# Patient Record
Sex: FEMALE | Race: WHITE | Employment: FULL TIME | URBAN - METROPOLITAN AREA
[De-identification: names, ages, dates, MRNs, and addresses within clinical notes are randomized per-mention and may not be internally consistent; named-entity substitution may affect disease eponyms.]

---

## 2018-03-23 ENCOUNTER — HOSPITAL ENCOUNTER (OUTPATIENT)
Dept: GENERAL RADIOLOGY | Age: 43
Discharge: HOME | End: 2018-03-23
Attending: INTERNAL MEDICINE
Payer: COMMERCIAL

## 2018-03-23 DIAGNOSIS — R07.9 RIGHT-SIDED CHEST PAIN: ICD-10-CM

## 2018-03-23 PROCEDURE — 71101 X-RAY EXAM UNILAT RIBS/CHEST: CPT

## 2018-09-07 ENCOUNTER — HOSPITAL ENCOUNTER (OUTPATIENT)
Dept: CT IMAGING | Age: 43
Discharge: HOME | End: 2018-09-07
Payer: COMMERCIAL

## 2018-09-07 DIAGNOSIS — M94.0 COSTOCHONDRITIS: ICD-10-CM

## 2018-09-07 PROCEDURE — 74150 CT ABDOMEN W/O CONTRAST: CPT

## 2019-01-15 ENCOUNTER — OFFICE VISIT (OUTPATIENT)
Dept: NEUROLOGY | Age: 44
End: 2019-01-15

## 2019-01-15 VITALS
SYSTOLIC BLOOD PRESSURE: 138 MMHG | BODY MASS INDEX: 25.88 KG/M2 | HEIGHT: 62 IN | WEIGHT: 140.6 LBS | DIASTOLIC BLOOD PRESSURE: 76 MMHG | HEART RATE: 91 BPM | RESPIRATION RATE: 17 BRPM | OXYGEN SATURATION: 99 %

## 2019-01-15 DIAGNOSIS — I67.89 CEREBRAL MICROVASCULAR DISEASE: ICD-10-CM

## 2019-01-15 DIAGNOSIS — J34.9 DISEASE OF NASAL CAVITY AND SINUSES: Primary | ICD-10-CM

## 2019-01-15 DIAGNOSIS — R56.9 CONVULSIONS, UNSPECIFIED CONVULSION TYPE (HCC): ICD-10-CM

## 2019-01-15 DIAGNOSIS — R41.82 ALTERED MENTAL STATUS, UNSPECIFIED ALTERED MENTAL STATUS TYPE: ICD-10-CM

## 2019-01-15 DIAGNOSIS — Z86.011 HISTORY OF CEREBRAL MENINGIOMA: ICD-10-CM

## 2019-01-15 DIAGNOSIS — I65.23 BILATERAL CAROTID ARTERY STENOSIS: Primary | ICD-10-CM

## 2019-01-15 DIAGNOSIS — I65.23 BILATERAL CAROTID ARTERY STENOSIS: ICD-10-CM

## 2019-01-15 NOTE — PROGRESS NOTES
Consult REFERRED BY: 
Omar Blanc MD 
 
CHIEF COMPLAINT: Recurrent spells of altered mental status, slight confusion, slight cognitive slowing Subjective:  
 
Carissa Corey is a 37 y.o. handed  female seen as a new patient to me at the request of Dr. Damaris Rodriguez and Dr. Chrissy Andrews for evaluation of new problem of 2 recurring episodes one occurring in July and one occurring in October both lasting about a week in duration, difficulty with focusing mentally, having some left-sided facial numbness, seemingly foggy, having a little difficulty focusing in on driving, and just feeling mentally off without clear precipitating cause. Patient does have a past history of meningioma resection in 2012 of the left posterior cerebral meningioma and just had a recent MRI scan done in November 2018 that showed stable appearance of the left occipital region and postsurgical changes and mild encephalomalacia but also showing marked obstructive sinus disease on the left side. Patient has seen ENT sees no bony erosion on CT of the sinuses, and suggested the possibility of surgery versus conservative management, and the patient has no fever, no signs of active sinusitis, no headache, and no nasal discharge and she is not quite sure what to do and I do not think that is probably related to these episodes of mental changes. She took [address] for 2 months after the surgery but never really had a history of seizures. Her neurosurgeon has released her and says she does not need any other follow-up since she is 6 years status post surgery. She has no visual symptoms, no focal weakness or sensory loss with any of these episodes, and nobody at her work complained of her mental abilities and her  did not notice any real change even though she felt slightly off.   During this time she denies any increased stress tension, denies any evident cause, no fever, no meningismus, no headaches, no unusual toxins trauma, the patient has no other previous history of focal neurologic deficits except for some slight visual changes that prompted her evaluation and meningioma resection, but they have all been nonexistent since her surgery. She has no other major medical problems. She denies any other previous stress or psychiatric problems. She does take thyroid supplement and had her gallbladder removed and has some chronic GI complaints ever since her gallbladder surgery. She has never had a stroke or TIA. Her bowel and bladder functions normal, and she is a good diet, no unusual weight gain or weight loss and no other systemic symptoms. Past Medical History:  
Diagnosis Date  Asthma EXERCISED INDUCED  Disease of nasal cavity and sinuses  History of cerebral meningioma  Hypothyroid   Hypothyroidism  Ill-defined condition LICHEN PLANUS SKIN DISORDER  
 Neurological disorder Past Surgical History:  
Procedure Laterality Date  HX BREAST BIOPSY Left NEG  
 HX CHOLECYSTECTOMY  16  
 [employer] [facility]-Dr. Jorge Paul  HX OTHER SURGICAL    
 brain tumor removal  
 NEUROLOGICAL PROCEDURE UNLISTED   BENIGN BRAIN TUMOR REMOVED Family History Problem Relation Age of Onset  Hypertension Mother  Elevated Lipids Mother  Cancer Father ESAPHOGUS  
 COPD Father  Hypertension Sister  Elevated Lipids Sister  Thyroid Disease Sister  Arthritis-osteo Sister  Heart Disease Brother  No Known Problems Sister  No Known Problems Sister Social History Tobacco Use  Smoking status: Former Smoker Last attempt to quit: 2009 Years since quittin.6  Smokeless tobacco: Never Used Substance Use Topics  Alcohol use: Yes Alcohol/week: 4.8 oz Types: 8 Cans of beer per week Current Outpatient Medications:   fluticasone (CUTIVATE) 0.05 % topical cream, , Disp: , Rfl: 1 
  PROAIR HFA 90 mcg/actuation inhaler, , Disp: , Rfl: 4 
  levothyroxine (SYNTHROID) 50 mcg tablet, , Disp: , Rfl: 1   ALYACEN 1/35, 28, 1-35 mg-mcg per tablet, , Disp: , Rfl: 5 No Known Allergies MRI Results (most recent): No results found for this or any previous visit. No results found for this or any previous visit. Review of Systems: A comprehensive review of systems was negative except for: Constitutional: positive for fatigue and malaise Neurological: positive for headaches, memory problems, coordination problems and Confusional concentration difficulty Vitals:  
 01/15/19 0801 BP: 138/76 Pulse: 91  
Resp: 17 SpO2: 99% Weight: 140 lb 9.6 oz (63.8 kg) Height: 5' 2\" (1.575 m) Objective: I 
 
 
NEUROLOGICAL EXAM: 
 
Appearance: The patient is well developed, well nourished, provides a coherent history and is in no acute distress. Mental Status: Oriented to time, place and person, and the president, patient could remember 3 of 3 words at 30 seconds with distraction, could do serial sevens okay, spell world backwards, and draw a clock that shows a time 10:50, and cognitive function is normal and speech is fluent and no aphasia or dysarthria. Mood and affect appropriate. Cranial Nerves:   Intact visual fields. Visual acuity was 20/20 on the right and 20/25 on the left, Fundi are benign, disc are flat, no lesions seen on funduscopy. LOLA, EOM's full, no nystagmus, no ptosis. Facial sensation is normal. Corneal reflexes are not tested. Facial movement is symmetric. Hearing is normal bilaterally. Palate is midline with normal sternocleidomastoid and trapezius muscles are normal. Tongue is midline. Neck without meningismus or bruits Temporal arteries are not tender or enlarged TMJ areas are not tender on palpation Motor:  5/5 strength in upper and lower proximal and distal muscles. Normal bulk and tone. No fasciculations. Rapid alternating movement is symmetric and intact bilaterally Reflexes:   Deep tendon reflexes 2+/4 and symmetrical. 
No babinski or clonus present Sensory:   Normal to touch, pinprick and vibration and temperature. DSS is intact Gait:  Normal gait for patient's age. Tremor:   No tremor noted. Cerebellar:  No abnormal cerebellar signs present on Romberg and tandem testing and finger-nose-finger exam.  
Neurovascular:  Normal heart sounds and regular rhythm, peripheral pulses intact, and no carotid bruits. Assessment: ICD-10-CM ICD-9-CM 1. Disease of nasal cavity and sinuses J34.9 478.19 NEURO EEG 24 HR   
   DUPLEX CAROTID BILATERAL AMB NEURO 2. History of cerebral meningioma Z86.69 V12.41 NEURO EEG 24 HR   
   DUPLEX CAROTID BILATERAL AMB NEURO 3. Altered mental status, unspecified altered mental status type R41.82 780.97 NEURO EEG 24 HR   
   DUPLEX CAROTID BILATERAL AMB NEURO 4. Convulsions, unspecified convulsion type ([facility] [address] R56.9 780.39 NEURO EEG 24 HR   
   DUPLEX CAROTID BILATERAL AMB NEURO 5. Bilateral carotid artery stenosis I65.23 433.10 NEURO EEG 24 HR   
  433.30 DUPLEX CAROTID BILATERAL AMB NEURO 6. Cerebral microvascular disease I67.9 437.9 NEURO EEG 24 HR   
   DUPLEX CAROTID BILATERAL AMB NEURO Active Problems: * No active hospital problems. * 
 
 
Plan:  
 
Unusual history of cognitive issues occurring for about a week in July and October of unclear etiology, with unremarkable MRI of the brain and a normal exam now She does not seem to show any cognitive difficulty or deficits, and does not seem to have any focal deficits on exam. 
She had an MRI scan of the brain 1 month ago showed stable postop changes but no new lesions.  
Patient will check carotid Doppler study to make sure there is no vascular insufficiency, and check an ambulatory 24-hour EEG just to make sure there is no abnormal discharges or partial complex seizures occurring Patient will ll check my chart for results of her test, or call us for results done, and call us also if she has any recurrent symptoms or other focal neurological deficits We will follow the patient again in 6 months time or earlier as needed and she will call immediately has any further spells. Encouraged to remain mentally and physically active, her vitamins and her thyroid pills and vitamin D on a daily basis, call us if she has any other problems. Difficult case, one hour spent with patient, reviewing her x-rays, review her labs, her diagnosis prognosis and further treatment evaluation and therapy to be considered. Signed By: Janak Gallegos MD   
 January 15, 2019 CC: Leodan Mix MD 
FAX: [phone] This note will not be viewable in [address]

## 2019-01-15 NOTE — PROGRESS NOTES
Chief Complaint Patient presents with  New Patient  
  trouble concentrating, left side of sinus area is blocked 1. Have you been to the ER, urgent care clinic since your last visit? Hospitalized since your last visit? 2. Have you seen or consulted any other health care providers outside of the [address] [place] [address] since your last visit? Dr. Mode Fontaine, neurosurgical at [facility]

## 2019-01-16 NOTE — PROCEDURES
This study consisted of pulsed wave Doppler examination, Color-flow imaging, and Duplex imaging of both the right and left carotid systems, and both vertebral arteries.     Imaging of both right and left carotid systems showed no mixed plaquing at the bifurcations and proximal and distal internal and external carotid arteries bilaterally, with stenosis in the range of 0% only and with no flow abnormalities identified.     Both vertebral arteries showed normal antegrade flow

## 2019-02-06 ENCOUNTER — HOSPITAL ENCOUNTER (OUTPATIENT)
Dept: NEUROLOGY | Age: 44
Discharge: HOME | End: 2019-02-06
Attending: PSYCHIATRY & NEUROLOGY
Payer: COMMERCIAL

## 2019-02-06 DIAGNOSIS — J34.9 DISEASE OF NASAL CAVITY AND SINUSES: ICD-10-CM

## 2019-02-06 DIAGNOSIS — I67.89 CEREBRAL MICROVASCULAR DISEASE: ICD-10-CM

## 2019-02-06 DIAGNOSIS — I65.23 BILATERAL CAROTID ARTERY STENOSIS: ICD-10-CM

## 2019-02-06 DIAGNOSIS — R41.82 ALTERED MENTAL STATUS, UNSPECIFIED ALTERED MENTAL STATUS TYPE: ICD-10-CM

## 2019-02-06 DIAGNOSIS — Z86.011 HISTORY OF CEREBRAL MENINGIOMA: ICD-10-CM

## 2019-02-06 DIAGNOSIS — R56.9 CONVULSIONS, UNSPECIFIED CONVULSION TYPE (HCC): ICD-10-CM

## 2019-02-06 PROCEDURE — 95953 NEURO EEG 24 HR: CPT

## 2019-02-08 PROBLEM — R41.82 ALTERED MENTAL STATUS, UNSPECIFIED: Status: ACTIVE | Noted: 2019-02-08

## 2019-02-08 PROBLEM — R56.9 CONVULSION (HCC): Status: ACTIVE | Noted: 2019-02-08

## 2019-02-09 NOTE — PROCEDURES
[facility]  EEG    Name:  Olena Vazquez  MR#:  [id number]  :  1975  ACCOUNT #:  [de-identified]  DATE OF SERVICE:    24-HOUR AMBULATORY EEG RECORDING    CLINICAL INDICATIONS:  The patient is a 55-year-old female with a history of sudden  confusion episodes. The patient with numbness on the left side. EEG to rule out  seizures, rule out cortical abnormality, rule out discharge. The patient with past  history of meningoma of the brain. EEG CLASSIFICATION:  Dysrhythmia grade I, left hemisphere, maximum occipital.    DESCRIPTION OF THE RECORD:  This is a 16-channel 24-hour prolonged EEG recording on  patient to rule out seizures with EKG monitoring throughout the study also. This  study began on 2019 at approximately 11:59 a.m. and ended on 2019 at  approximately 12:31 p.m. along with 25 hours of EEG recording. This study was done  to rule out seizure in a patient having confusion episode; headaches; and left-sided  numbness twice, once in the summer and once in the fall of this year. Rule out  seizures. The patient with history of meningoma resected in the left occipital  region. BACKGROUND HISTORY:   Posteriorly located occipital alpha rhythm of 9 to 10 Hz that  did attenuate some with eye opening. During the recording, there was considerable  movement, muscle artifact seen at times, but overall technically the study was of  very good quality. The last hour of the study was approximately 12 noon to 1 p.m.  showed a persistent artifact through most of that last hour recording. Rest of the  study was fairly well interpretable. There were no clear areas of spike discharges  seen nor abnormal discharges seen. At times, there was borderline slowing in the  left posterior head regions somewhat due to drowsiness. No recorded spells of any  type were identified. The patient's diary has not returned so far at this time.   The  patient did have a prolonged phase of sleep with K-complexes and sleep spindles seen  in the central head regions. Neither hyperventilation nor photic stimulation was  performed. Again, there was no clear focal abnormality identified. No spike or  spike-and-wave discharges seen. No recorded spells of any type on the EEG. There  was a slight borderline increase in some 2 to 6 Hz daily activity at times seen in  occipital head region on the left side during recording. INTERPRETATION:  This is a minimally abnormal electroencephalogram due to some  borderline slowing seen in the left occipital region. There were no clear areas of  spike or spike-and-wave discharge, no recorded spells of any type. No dysrhythmic  activity.         Keely Martin MD      [id number]  D:  02/08/2019 13:32  T:  02/09/2019 5:00  JOB #:  [id number]  CC:  Joe Mitchell MD

## 2019-02-14 ENCOUNTER — TELEPHONE (OUTPATIENT)
Dept: NEUROLOGY | Age: 44
End: 2019-02-14

## 2019-07-17 ENCOUNTER — OFFICE VISIT (OUTPATIENT)
Dept: NEUROLOGY | Age: 44
End: 2019-07-17

## 2019-07-17 VITALS
WEIGHT: 143 LBS | RESPIRATION RATE: 12 BRPM | DIASTOLIC BLOOD PRESSURE: 72 MMHG | HEIGHT: 62 IN | HEART RATE: 87 BPM | SYSTOLIC BLOOD PRESSURE: 134 MMHG | OXYGEN SATURATION: 99 % | BODY MASS INDEX: 26.31 KG/M2

## 2019-07-17 DIAGNOSIS — Z86.011 HISTORY OF CEREBRAL MENINGIOMA: ICD-10-CM

## 2019-07-17 DIAGNOSIS — R41.82 ALTERED MENTAL STATUS, UNSPECIFIED ALTERED MENTAL STATUS TYPE: ICD-10-CM

## 2019-07-17 DIAGNOSIS — R56.9 CONVULSIONS, UNSPECIFIED CONVULSION TYPE (HCC): Primary | ICD-10-CM

## 2019-07-17 RX ORDER — NORETHINDRONE ACETATE AND ETHINYL ESTRADIOL 1MG-20(24)
KIT ORAL
Refills: 3 | COMMUNITY
Start: 2019-05-18

## 2019-07-17 NOTE — PATIENT INSTRUCTIONS
A Healthy Lifestyle: Care Instructions  Your Care Instructions    A healthy lifestyle can help you feel good, stay at a healthy weight, and have plenty of energy for both work and play. A healthy lifestyle is something you can share with your whole family. A healthy lifestyle also can lower your risk for serious health problems, such as high blood pressure, heart disease, and diabetes. You can follow a few steps listed below to improve your health and the health of your family. Follow-up care is a key part of your treatment and safety. Be sure to make and go to all appointments, and call your doctor if you are having problems. It's also a good idea to know your test results and keep a list of the medicines you take. How can you care for yourself at home? · Do not eat too much sugar, fat, or fast foods. You can still have dessert and treats now and then. The goal is moderation. · Start small to improve your eating habits. Pay attention to portion sizes, drink less juice and soda pop, and eat more fruits and vegetables. ? Eat a healthy amount of food. A 3-ounce serving of meat, for example, is about the size of a deck of cards. Fill the rest of your plate with vegetables and whole grains. ? Limit the amount of soda and sports drinks you have every day. Drink more water when you are thirsty. ? Eat at least 5 servings of fruits and vegetables every day. It may seem like a lot, but it is not hard to reach this goal. A serving or helping is 1 piece of fruit, 1 cup of vegetables, or 2 cups of leafy, raw vegetables. Have an apple or some carrot sticks as an afternoon snack instead of a candy bar. Try to have fruits and/or vegetables at every meal.  · Make exercise part of your daily routine. You may want to start with simple activities, such as walking, bicycling, or slow swimming. Try to be active 30 to 60 minutes every day. You do not need to do all 30 to 60 minutes all at once.  For example, you can exercise 3 times a day for 10 or 20 minutes. Moderate exercise is safe for most people, but it is always a good idea to talk to your doctor before starting an exercise program.  · Keep moving. Krystin Garciaas the lawn, work in the garden, or [employer]. Take the stairs instead of the elevator at work. · If you smoke, quit. People who smoke have an increased risk for heart attack, stroke, cancer, and other lung illnesses. Quitting is hard, but there are ways to boost your chance of quitting tobacco for good. ? Use nicotine gum, patches, or lozenges. ? Ask your doctor about stop-smoking programs and medicines. ? Keep trying. In addition to reducing your risk of diseases in the future, you will notice some benefits soon after you stop using tobacco. If you have shortness of breath or asthma symptoms, they will likely get better within a few weeks after you quit. · Limit how much alcohol you drink. Moderate amounts of alcohol (up to 2 drinks a day for men, 1 drink a day for women) are okay. But drinking too much can lead to liver problems, high blood pressure, and other health problems. Family health  If you have a family, there are many things you can do together to improve your health. · Eat meals together as a family as often as possible. · Eat healthy foods. This includes fruits, vegetables, lean meats and dairy, and whole grains. · Include your family in your fitness plan. Most people think of activities such as jogging or tennis as the way to fitness, but there are many ways you and your family can be more active. Anything that makes you breathe hard and gets your heart pumping is exercise. Here are some tips:  ? Walk to do errands or to take your child to school or the bus.  ? Go for a family bike ride after dinner instead of watching TV. Where can you learn more? Go to http://delia-lele.info/. Enter W518 in the search box to learn more about \"A Healthy Lifestyle: Care Instructions. \"  Current as of: September 11, 2018  Content Version: 11.9  © 2053-6740 [employer]. Care instructions adapted under license by [employer] [place] (which disclaims liability or warranty for this information). If you have questions about a medical condition or this instruction, always ask your healthcare professional. [address] any warranty or liability for your use of this information. Office Policies    o Phone calls/patient messages:  Please allow up to 24 hours for someone in the office to contact you about your call or message. Be mindful your provider may be out of the office or your message may require further review. We encourage you to use [employer] for your messages as this is a faster, more efficient way to communicate with our office    o Medication Refills:  Prescription medications require up to 48 business hours to process. We encourage you to use [employer] for your refills. For controlled medications: Please allow up to 72 business hours to process. Certain medications may require you to  a written prescription at our office. NO narcotic/controlled medications will be prescribed after 4pm Monday through Friday or on weekends    o Form/Paperwork Completion:  We ask that you allow 7-14 business days.  You may also download your forms to [employer] to have your doctor print off.        [id number]

## 2019-07-17 NOTE — PROGRESS NOTES
Consult  REFERRED BY:  Nina Martinez MD    CHIEF COMPLAINT: Recurrent spells of altered mental status, slight confusion, slight cognitive slowing      Subjective:     Edna Sheehan is a 40 y.o. handed  female seen at the request of Dr. Ranjan Mckeon and Dr. Espinosa Fearing for evaluation of problem of recurring episodes one occurring in July 2018 and one occurring in October 2018 both lasting about a week in duration, difficulty with focusing mentally, having some left-sided facial numbness, seemingly foggy, having a little difficulty focusing in on driving, and just feeling mentally off without clear precipitating cause. Patient has had intermittent brief ones since last seen in January for this problem, but no loss of conscious, he is able to continue all her activities during this time, just feels a little spaced out. She had a normal 24-hour ambulatory EEG other than just borderline slowing in her surgical site in the left occipital region, stable MRI scan of the brain showing only postop changes, and carotid Doppler studies that show 0% stenosis in all vessels. Patient does not think there are drowsy spells, she does snore at night but has no sleep apnea and no excessive daytime sleepiness. She has no other neurological symptoms during this time. They do not occur with stress and tension. We will just follow these for the next 6 months, see how she does after she gets sinus surgery for severe sinus disease on the left side, and may be somehow that is related to her symptoms. Patient does have a past history of meningioma resection in 2012 of the left posterior cerebral meningioma and just had a recent MRI scan done in November 2018 that showed stable appearance of the left occipital region and postsurgical changes and mild encephalomalacia but also showing marked obstructive sinus disease on the left side.   Patient has seen ENT sees no bony erosion on CT of the sinuses, and suggested the possibility of surgery versus conservative management, and the patient has no fever, no signs of active sinusitis, no headache, and no nasal discharge and she is not quite sure what to do and I do not think that is probably related to these episodes of mental changes. She took [address] for 2 months after the surgery but never really had a history of seizures. Her neurosurgeon has released her and says she does not need any other follow-up since she is 6 years status post surgery. She has no visual symptoms, no focal weakness or sensory loss with any of these episodes, and nobody at her work complained of her mental abilities and her  did not notice any real change even though she felt slightly off. During this time she denies any increased stress tension, denies any evident cause, no fever, no meningismus, no headaches, no unusual toxins trauma, the patient has no other previous history of focal neurologic deficits except for some slight visual changes that prompted her evaluation and meningioma resection, but they have all been nonexistent since her surgery. She has no other major medical problems. She denies any other previous stress or psychiatric problems. She does take thyroid supplement and had her gallbladder removed and has some chronic GI complaints ever since her gallbladder surgery. She has never had a stroke or TIA. Her bowel and bladder functions normal, and she is a good diet, no unusual weight gain or weight loss and no other systemic symptoms.     Past Medical History:   Diagnosis Date    Asthma     EXERCISED INDUCED    Disease of nasal cavity and sinuses     History of cerebral meningioma     Hypothyroid 2010    Hypothyroidism     Ill-defined condition     LICHEN PLANUS SKIN DISORDER    Neurological disorder       Past Surgical History:   Procedure Laterality Date    HX BREAST BIOPSY Left     NEG    HX CHOLECYSTECTOMY  4-19-16    [facility]Dr. Fletcher Peeling    HX OTHER SURGICAL 2012    brain tumor removal    NEUROLOGICAL PROCEDURE UNLISTED  2012    BENIGN BRAIN TUMOR REMOVED     Family History   Problem Relation Age of Onset    Hypertension Mother    Preet Long Elevated Lipids Mother     Cancer Father         ESAPHOGUS    COPD Father     Hypertension Sister     Elevated Lipids Sister     Thyroid Disease Sister     Arthritis-osteo Sister     Heart Disease Brother     No Known Problems Sister     No Known Problems Sister       Social History     Tobacco Use    Smoking status: Former Smoker     Last attempt to quit: 5/9/2009     Years since quitting: 10.1    Smokeless tobacco: Never Used   Substance Use Topics    Alcohol use: Yes     Alcohol/week: 8.0 standard drinks     Types: 8 Cans of beer per week         Current Outpatient Medications:     BLISOVI 24 FE 1 mg-20 mcg (24)/75 mg (4) tab, TAKE 1 TABLET BY MOUTH EVERY DAY, Disp: , Rfl: 3    PROAIR HFA 90 mcg/actuation inhaler, , Disp: , Rfl: 4    levothyroxine (SYNTHROID) 50 mcg tablet, , Disp: , Rfl: 1        No Known Allergies   MRI Results (most recent):  No results found for this or any previous visit. No results found for this or any previous visit. Review of Systems:  A comprehensive review of systems was negative except for: Constitutional: positive for fatigue and malaise  Neurological: positive for headaches, memory problems, coordination problems and Confusional concentration difficulty   Vitals:    07/17/19 0807   BP: 134/72   Pulse: 87   Resp: 12   SpO2: 99%   Weight: 143 lb (64.9 kg)   Height: 5' 2\" (1.575 m)     Objective:     I      NEUROLOGICAL EXAM:    Appearance: The patient is well developed, well nourished, provides a coherent history and is in no acute distress.    Mental Status: Oriented to time, place and person, and the president, patient could remember 3 of 3 words at 30 seconds with distraction, could do serial sevens okay, spell world backwards, and draw a clock that shows a time 10:50, and cognitive function is normal and speech is fluent and no aphasia or dysarthria. Mood and affect appropriate. Cranial Nerves:   Intact visual fields. Visual acuity was 20/20 on the right and 20/25 on the left, Fundi are benign, disc are flat, no lesions seen on funduscopy. LOLA, EOM's full, no nystagmus, no ptosis. Facial sensation is normal. Corneal reflexes are not tested. Facial movement is symmetric. Hearing is normal bilaterally. Palate is midline with normal sternocleidomastoid and trapezius muscles are normal. Tongue is midline. Neck without meningismus or bruits  Temporal arteries are not tender or enlarged  TMJ areas are not tender on palpation   Motor:  5/5 strength in upper and lower proximal and distal muscles. Normal bulk and tone. No fasciculations. Rapid alternating movement is symmetric and intact bilaterally   Reflexes:   Deep tendon reflexes 2+/4 and symmetrical.  No babinski or clonus present   Sensory:   Normal to touch, pinprick and vibration and temperature. DSS is intact   Gait:  Normal gait for patient's age. Tremor:   No tremor noted. Cerebellar:  No abnormal cerebellar signs present on Romberg and tandem testing and finger-nose-finger exam.   Neurovascular:  Normal heart sounds and regular rhythm, peripheral pulses intact, and no carotid bruits. Assessment:       ICD-10-CM ICD-9-CM    1. Convulsions, unspecified convulsion type ([facility] [address] R56.9 780.39    2. Altered mental status, unspecified altered mental status type R41.82 780.97    3. History of cerebral meningioma Z86.69 V12.41      Active Problems:    * No active hospital problems.  *      Plan:     Unusual history of cognitive issues occurring for about a week in July and October of unclear etiology, with unremarkable MRI of the brain and a normal exam now an essentially normal EEG for postop state, and carotid Dopplers  We reviewed all these personally on the PACS system, and I agree that they were unremarkable for any explanation of her symptoms. She does not seem to show any cognitive difficulty or deficits, and does not seem to have any focal deficits on exam.  We talked to the patient, she wants to wait till to get sinus surgery to see if that might be the cause of her slight spaced out feeling. We will follow the patient again in 6 months time or earlier as needed and she will call immediately has any further spells. Encouraged to remain mentally and physically active, her vitamins and her thyroid pills and vitamin D on a daily basis, call us if she has any other problems. Difficult case, 35 minutes spent with patient, reviewing her x-rays, review her labs, her diagnosis prognosis and further treatment evaluation and therapy to be considered. Signed By: Michele Jordan MD     July 17, 2019       CC: Yoseph Tariq MD  FAX: [phone]    This note will not be viewable in [address]

## 2019-07-17 NOTE — LETTER
7/17/2019 8:02 PM 
 
Patient:  Anna Gilliam YOB: 1975 Date of Visit: 7/17/2019 Dear No Recipients: Thank you for referring Ms. Anna Gilliam to me for evaluation/treatment. Below are the relevant portions of my assessment and plan of care. Consult REFERRED BY: 
Matt Torres MD 
 
CHIEF COMPLAINT: Recurrent spells of altered mental status, slight confusion, slight cognitive slowing Subjective:  
 
Anna Gilliam is a 40 y.o. handed  female seen at the request of Dr. Kaitlynn Hancock and Dr. Chyna Jimenez for evaluation of problem of recurring episodes one occurring in July 2018 and one occurring in October 2018 both lasting about a week in duration, difficulty with focusing mentally, having some left-sided facial numbness, seemingly foggy, having a little difficulty focusing in on driving, and just feeling mentally off without clear precipitating cause. Patient has had intermittent brief ones since last seen in January for this problem, but no loss of conscious, he is able to continue all her activities during this time, just feels a little spaced out. She had a normal 24-hour ambulatory EEG other than just borderline slowing in her surgical site in the left occipital region, stable MRI scan of the brain showing only postop changes, and carotid Doppler studies that show 0% stenosis in all vessels. Patient does not think there are drowsy spells, she does snore at night but has no sleep apnea and no excessive daytime sleepiness. She has no other neurological symptoms during this time. They do not occur with stress and tension. We will just follow these for the next 6 months, see how she does after she gets sinus surgery for severe sinus disease on the left side, and may be somehow that is related to her symptoms.   Patient does have a past history of meningioma resection in 2012 of the left posterior cerebral meningioma and just had a recent MRI scan done in November 2018 that showed stable appearance of the left occipital region and postsurgical changes and mild encephalomalacia but also showing marked obstructive sinus disease on the left side. Patient has seen ENT sees no bony erosion on CT of the sinuses, and suggested the possibility of surgery versus conservative management, and the patient has no fever, no signs of active sinusitis, no headache, and no nasal discharge and she is not quite sure what to do and I do not think that is probably related to these episodes of mental changes. She took [address] [employer] for 2 months after the surgery but never really had a history of seizures. Her neurosurgeon has released her and says she does not need any other follow-up since she is 6 years status post surgery. She has no visual symptoms, no focal weakness or sensory loss with any of these episodes, and nobody at her work complained of her mental abilities and her  did not notice any real change even though she felt slightly off. During this time she denies any increased stress tension, denies any evident cause, no fever, no meningismus, no headaches, no unusual toxins trauma, the patient has no other previous history of focal neurologic deficits except for some slight visual changes that prompted her evaluation and meningioma resection, but they have all been nonexistent since her surgery. She has no other major medical problems. She denies any other previous stress or psychiatric problems. She does take thyroid supplement and had her gallbladder removed and has some chronic GI complaints ever since her gallbladder surgery. She has never had a stroke or TIA. Her bowel and bladder functions normal, and she is a good diet, no unusual weight gain or weight loss and no other systemic symptoms. Past Medical History:  
Diagnosis Date  Asthma  EXERCISED INDUCED  
  Disease of nasal cavity and sinuses  History of cerebral meningioma  Hypothyroid 2010  Hypothyroidism  Ill-defined condition LICHEN PLANUS SKIN DISORDER  
 Neurological disorder Past Surgical History:  
Procedure Laterality Date  HX BREAST BIOPSY Left NEG  
 HX CHOLECYSTECTOMY  4-19-16  
 [facility]Dr. Mckenna Stone  HX OTHER SURGICAL  2012  
 brain tumor removal  
 NEUROLOGICAL PROCEDURE UNLISTED  2012 BENIGN BRAIN TUMOR REMOVED Family History Problem Relation Age of Onset  Hypertension Mother  Elevated Lipids Mother  Cancer Father ESAPHOGUS  
 COPD Father  Hypertension Sister  Elevated Lipids Sister  Thyroid Disease Sister  Arthritis-osteo Sister  Heart Disease Brother  No Known Problems Sister  No Known Problems Sister Social History Tobacco Use  Smoking status: Former Smoker Last attempt to quit: 5/9/2009 Years since quitting: 10.1  Smokeless tobacco: Never Used Substance Use Topics  Alcohol use: Yes Alcohol/week: 8.0 standard drinks Types: 8 Cans of beer per week Current Outpatient Medications:  
  BLISOVI 24 FE 1 mg-20 mcg (24)/75 mg (4) tab, TAKE 1 TABLET BY MOUTH EVERY DAY, Disp: , Rfl: 3 
  PROAIR HFA 90 mcg/actuation inhaler, , Disp: , Rfl: 4 
  levothyroxine (SYNTHROID) 50 mcg tablet, , Disp: , Rfl: 1 No Known Allergies MRI Results (most recent): No results found for this or any previous visit. No results found for this or any previous visit. Review of Systems: A comprehensive review of systems was negative except for: Constitutional: positive for fatigue and malaise Neurological: positive for headaches, memory problems, coordination problems and Confusional concentration difficulty Vitals:  
 07/17/19 2087 BP: 134/72 Pulse: 87 Resp: 12 SpO2: 99% Weight: 143 lb (64.9 kg) Height: 5' 2\" (1.575 m) Objective: I 
 
 
 NEUROLOGICAL EXAM: 
 
Appearance: The patient is well developed, well nourished, provides a coherent history and is in no acute distress. Mental Status: Oriented to time, place and person, and the president, patient could remember 3 of 3 words at 30 seconds with distraction, could do serial sevens okay, spell world backwards, and draw a clock that shows a time 10:50, and cognitive function is normal and speech is fluent and no aphasia or dysarthria. Mood and affect appropriate. Cranial Nerves:   Intact visual fields. Visual acuity was 20/20 on the right and 20/25 on the left, Fundi are benign, disc are flat, no lesions seen on funduscopy. LOLA, EOM's full, no nystagmus, no ptosis. Facial sensation is normal. Corneal reflexes are not tested. Facial movement is symmetric. Hearing is normal bilaterally. Palate is midline with normal sternocleidomastoid and trapezius muscles are normal. Tongue is midline. Neck without meningismus or bruits Temporal arteries are not tender or enlarged TMJ areas are not tender on palpation Motor:  5/5 strength in upper and lower proximal and distal muscles. Normal bulk and tone. No fasciculations. Rapid alternating movement is symmetric and intact bilaterally Reflexes:   Deep tendon reflexes 2+/4 and symmetrical. 
No babinski or clonus present Sensory:   Normal to touch, pinprick and vibration and temperature. DSS is intact Gait:  Normal gait for patient's age. Tremor:   No tremor noted. Cerebellar:  No abnormal cerebellar signs present on Romberg and tandem testing and finger-nose-finger exam.  
Neurovascular:  Normal heart sounds and regular rhythm, peripheral pulses intact, and no carotid bruits. Assessment: ICD-10-CM ICD-9-CM 1. Convulsions, unspecified convulsion type ([facility] [address] R56.9 780.39   
2. Altered mental status, unspecified altered mental status type R41.82 780.97   
3. History of cerebral meningioma Z86.69 V12.41 Active Problems: * No active hospital problems. * 
 
 
Plan:  
 
Unusual history of cognitive issues occurring for about a week in July and October of unclear etiology, with unremarkable MRI of the brain and a normal exam now an essentially normal EEG for postop state, and carotid Dopplers We reviewed all these personally on the PACS system, and I agree that they were unremarkable for any explanation of her symptoms. She does not seem to show any cognitive difficulty or deficits, and does not seem to have any focal deficits on exam. 
We talked to the patient, she wants to wait till to get sinus surgery to see if that might be the cause of her slight spaced out feeling. We will follow the patient again in 6 months time or earlier as needed and she will call immediately has any further spells. Encouraged to remain mentally and physically active, her vitamins and her thyroid pills and vitamin D on a daily basis, call us if she has any other problems. Difficult case, 35 minutes spent with patient, reviewing her x-rays, review her labs, her diagnosis prognosis and further treatment evaluation and therapy to be considered. Signed By: Olvin Rojas MD   
 July 17, 2019 CC: Jazmine Troy MD 
FAX: [phone] This note will not be viewable in [address] If you have questions, please do not hesitate to call me. I look forward to following Ms. Viviana Domínguez along with you. Sincerely, Olvin Rojas MD

## 2020-01-21 ENCOUNTER — OFFICE VISIT (OUTPATIENT)
Dept: NEUROLOGY | Age: 45
End: 2020-01-21

## 2020-01-21 VITALS
BODY MASS INDEX: 26.87 KG/M2 | WEIGHT: 146 LBS | HEART RATE: 82 BPM | DIASTOLIC BLOOD PRESSURE: 80 MMHG | RESPIRATION RATE: 12 BRPM | OXYGEN SATURATION: 98 % | HEIGHT: 62 IN | SYSTOLIC BLOOD PRESSURE: 124 MMHG

## 2020-01-21 DIAGNOSIS — J34.9 DISEASE OF NASAL CAVITY AND SINUSES: ICD-10-CM

## 2020-01-21 DIAGNOSIS — I65.23 BILATERAL CAROTID ARTERY STENOSIS: ICD-10-CM

## 2020-01-21 DIAGNOSIS — R41.82 ALTERED MENTAL STATUS, UNSPECIFIED ALTERED MENTAL STATUS TYPE: ICD-10-CM

## 2020-01-21 DIAGNOSIS — R56.9 CONVULSIONS, UNSPECIFIED CONVULSION TYPE (HCC): Primary | ICD-10-CM

## 2020-01-21 DIAGNOSIS — I67.89 CEREBRAL MICROVASCULAR DISEASE: ICD-10-CM

## 2020-01-21 DIAGNOSIS — Z86.011 HISTORY OF CEREBRAL MENINGIOMA: ICD-10-CM

## 2020-01-21 RX ORDER — MULTIVIT WITH MINERALS/HERBS
1 TABLET ORAL DAILY
COMMUNITY
End: 2021-02-11 | Stop reason: ALTCHOICE

## 2020-01-21 NOTE — LETTER
1/21/20 Patient: Oracio Felix YOB: 1975 Date of Visit: 1/21/2020 Rick Saleem MD 
[address] [place] [address] [address] [facility] 7 [id number] VIA Facsimile: [phone] Dear Rick Saleem MD, Thank you for referring Ms. Oracio Felix to [address] [place] [address] for evaluation. My notes for this consultation are attached. Consult REFERRED BY: 
Melquiades Funez MD 
 
CHIEF COMPLAINT: Recurrent spells of altered mental status, slight confusion, slight cognitive slowing Subjective:  
 
Oracio Felix is a 40 y.o. handed  female seen at the request of Dr. Rich Craig and Dr. Antonai Diamond for evaluation of problem of recurring episodes one occurring in July 2018 and one occurring in October 2018 both lasting about a week in duration, difficulty with focusing mentally, having some left-sided facial numbness, seemingly foggy, having a little difficulty focusing in on driving, and just feeling mentally off without clear precipitating cause. Patient has had no more spells since her last visit 6 months ago, and she has been rinsing her sinuses faithfully, taking an antibiotic course, and now her sinuses are much better also, and she feels great, has no problems, no headaches, no focal weakness, no sensory loss, no more episodes of confusion or difficulty concentrating and seems to be doing exceedingly well. Patient had intermittent brief ones early last year, but even they have gone away completely. She had a normal 24-hour ambulatory EEG other than just borderline slowing in her surgical site in the left occipital region, stable MRI scan of the brain October 2018 showing only postop changes, and carotid Doppler studies that show 0% stenosis in all vessels. Patient does not think there are drowsy spells, she does snore at night but has no sleep apnea and no excessive daytime sleepiness.   She has no other neurological symptoms during this time. They do not occur with stress and tension. Patient does have a past history of meningioma resection in 2012 of the left posterior cerebral meningioma and just had a recent MRI scan done in November 2018 that showed stable appearance of the left occipital region and postsurgical changes and mild encephalomalacia but also showing marked obstructive sinus disease on the left side. Patient is followed with neurosurgery and is to get another scan in about 2 years. Patient sees ENT. She took [address] for 2 months after the surgery but never really had a history of seizures. Her neurosurgeon has released her and says she does not need any other follow-up since she is 6 years status post surgery. She has no visual symptoms, no focal weakness or sensory loss with any of these episodes, and nobody at her work complained of her mental abilities and her  did not notice any real change even though she felt slightly off. During this time she denies any increased stress tension, denies any evident cause, no fever, no meningismus, no headaches, no unusual toxins trauma, the patient has no other previous history of focal neurologic deficits except for some slight visual changes that prompted her evaluation and meningioma resection, but they have all been nonexistent since her surgery. She has no other major medical problems. She denies any other previous stress or psychiatric problems. She does take thyroid supplement and had her gallbladder removed and has some chronic GI complaints ever since her gallbladder surgery. She has never had a stroke or TIA. Her bowel and bladder functions normal, and she is a good diet, no unusual weight gain or weight loss and no other systemic symptoms. Past Medical History:  
Diagnosis Date  Asthma EXERCISED INDUCED  Disease of nasal cavity and sinuses  History of cerebral meningioma  Hypothyroid 2010  Hypothyroidism  Ill-defined condition LICHEN PLANUS SKIN DISORDER  
 Neurological disorder Past Surgical History:  
Procedure Laterality Date  HX BREAST BIOPSY Left NEG  
 HX CHOLECYSTECTOMY  4-19-16  
 lap godwin - [facility]-Dr. Morena Douglas  HX OTHER SURGICAL  2012  
 brain tumor removal  
 NEUROLOGICAL PROCEDURE UNLISTED  2012 BENIGN BRAIN TUMOR REMOVED Family History Problem Relation Age of Onset  Hypertension Mother  Elevated Lipids Mother  Cancer Father ESAPHOGUS  
 COPD Father  Hypertension Sister  Elevated Lipids Sister  Thyroid Disease Sister  Arthritis-osteo Sister  Heart Disease Brother  No Known Problems Sister  No Known Problems Sister Social History Tobacco Use  Smoking status: Former Smoker Last attempt to quit: 5/9/2009 Years since quitting: 10.7  Smokeless tobacco: Never Used Substance Use Topics  Alcohol use: Yes Alcohol/week: 8.0 standard drinks Types: 8 Cans of beer per week Current Outpatient Medications:  
  b complex vitamins tablet, Take 1 Tab by mouth daily. , Disp: , Rfl:  
  BLISOVI 24 FE 1 mg-20 mcg (24)/75 mg (4) tab, TAKE 1 TABLET BY MOUTH EVERY DAY, Disp: , Rfl: 3 
  PROAIR HFA 90 mcg/actuation inhaler, , Disp: , Rfl: 4 
  levothyroxine (SYNTHROID) 50 mcg tablet, , Disp: , Rfl: 1 No Known Allergies MRI Results (most recent): No results found for this or any previous visit. No results found for this or any previous visit. Review of Systems: A comprehensive review of systems was negative except for: Constitutional: positive for fatigue and malaise Neurological: positive for headaches, memory problems, coordination problems and Confusional concentration difficulty Vitals:  
 01/21/20 0919 BP: 124/80 Pulse: 82 Resp: 12 SpO2: 98% Weight: 146 lb (66.2 kg) Height: 5' 2\" (1.575 m) Objective: I 
 
 
NEUROLOGICAL EXAM: 
 
 Appearance: The patient is well developed, well nourished, provides a coherent history and is in no acute distress. Mental Status: Oriented to time, place and person, and the president, patient could remember 3 of 3 words at 30 seconds with distraction, could do serial sevens okay, spell world backwards, and draw a clock that shows a time 10:50, and cognitive function is normal and speech is fluent and no aphasia or dysarthria. Mood and affect appropriate. Cranial Nerves:   Intact visual fields. Visual acuity was 20/20 on the right and 20/25 on the left, Fundi are benign, disc are flat, no lesions seen on funduscopy. LOLA, EOM's full, no nystagmus, no ptosis. Facial sensation is normal. Corneal reflexes are not tested. Facial movement is symmetric. Hearing is normal bilaterally. Palate is midline with normal sternocleidomastoid and trapezius muscles are normal. Tongue is midline. Neck without meningismus or bruits Temporal arteries are not tender or enlarged TMJ areas are not tender on palpation Motor:  5/5 strength in upper and lower proximal and distal muscles. Normal bulk and tone. No fasciculations. Rapid alternating movement is symmetric and intact bilaterally Reflexes:   Deep tendon reflexes 2+/4 and symmetrical. 
No babinski or clonus present Sensory:   Normal to touch, pinprick and vibration and temperature. DSS is intact Gait:  Normal gait for patient's age. Tremor:   No tremor noted. Cerebellar:  No abnormal cerebellar signs present on Romberg and tandem testing and finger-nose-finger exam.  
Neurovascular:  Normal heart sounds and regular rhythm, peripheral pulses intact, and no carotid bruits. Assessment: ICD-10-CM ICD-9-CM 1. Convulsions, unspecified convulsion [facility] ([facility] R56.9 780.39   
2. Disease of nasal cavity and sinuses J34.9 478.19   
3.  Altered mental status, unspecified altered mental status type R41.82 780.97   
 4. Bilateral carotid artery stenosis I65.23 433.10   
  433.30   
5. History of cerebral meningioma Z86.69 V12.41   
6. Cerebral microvascular disease I67.9 437.9 Active Problems: * No active hospital problems. * 
 
 
Plan:  
 
Patient continues to do exceedingly well, we will continue her current therapy, we will follow her again in 1 years time or earlier as needed she will call me if she has any further spells. Unusual history of cognitive issues occurring for about a week in July and October 2018 of unclear etiology, with unremarkable MRI of the brain and a normal exam now an essentially normal EEG for postop state, and carotid Dopplers We reviewed all these personally on the PACS system, and I agree that they were unremarkable for any explanation of her symptoms. She does not seem to show any cognitive difficulty or deficits, and does not seem to have any focal deficits on exam. 
We will follow the patient again in 12 months time or earlier as needed and she will call immediately has any further spells. Encouraged to remain mentally and physically active, her vitamins and her thyroid pills and vitamin D on a daily basis, call us if she has any other problems. Difficult case, 35 minutes spent with patient, reviewing her x-rays, review her labs, her diagnosis prognosis and further treatment evaluation and therapy to be considered. Signed By: Kristina Mejia MD   
 January 21, 2020 CC: Simran Womack MD 
FAX: [phone] This note will not be viewable in [address] If you have questions, please do not hesitate to call me. I look forward to following your patient along with you. Sincerely, Kristina Mejia MD

## 2020-01-21 NOTE — PROGRESS NOTES
Consult  REFERRED BY:  Lico Martinez MD    CHIEF COMPLAINT: Recurrent spells of altered mental status, slight confusion, slight cognitive slowing      Subjective:     Gustavo Mercado is a 40 y.o. handed  female seen at the request of Dr. Ze Arreola and Dr. Maddie Mccallum for evaluation of problem of recurring episodes one occurring in July 2018 and one occurring in October 2018 both lasting about a week in duration, difficulty with focusing mentally, having some left-sided facial numbness, seemingly foggy, having a little difficulty focusing in on driving, and just feeling mentally off without clear precipitating cause. Patient has had no more spells since her last visit 6 months ago, and she has been rinsing her sinuses faithfully, taking an antibiotic course, and now her sinuses are much better also, and she feels great, has no problems, no headaches, no focal weakness, no sensory loss, no more episodes of confusion or difficulty concentrating and seems to be doing exceedingly well. Patient had intermittent brief ones early last year, but even they have gone away completely. She had a normal 24-hour ambulatory EEG other than just borderline slowing in her surgical site in the left occipital region, stable MRI scan of the brain October 2018 showing only postop changes, and carotid Doppler studies that show 0% stenosis in all vessels. Patient does not think there are drowsy spells, she does snore at night but has no sleep apnea and no excessive daytime sleepiness. She has no other neurological symptoms during this time. They do not occur with stress and tension. Patient does have a past history of meningioma resection in 2012 of the left posterior cerebral meningioma and just had a recent MRI scan done in November 2018 that showed stable appearance of the left occipital region and postsurgical changes and mild encephalomalacia but also showing marked obstructive sinus disease on the left side. Patient is followed with neurosurgery and is to get another scan in about 2 years. Patient sees ENT. She took [address] for 2 months after the surgery but never really had a history of seizures. Her neurosurgeon has released her and says she does not need any other follow-up since she is 6 years status post surgery. She has no visual symptoms, no focal weakness or sensory loss with any of these episodes, and nobody at her work complained of her mental abilities and her  did not notice any real change even though she felt slightly off. During this time she denies any increased stress tension, denies any evident cause, no fever, no meningismus, no headaches, no unusual toxins trauma, the patient has no other previous history of focal neurologic deficits except for some slight visual changes that prompted her evaluation and meningioma resection, but they have all been nonexistent since her surgery. She has no other major medical problems. She denies any other previous stress or psychiatric problems. She does take thyroid supplement and had her gallbladder removed and has some chronic GI complaints ever since her gallbladder surgery. She has never had a stroke or TIA. Her bowel and bladder functions normal, and she is a good diet, no unusual weight gain or weight loss and no other systemic symptoms.     Past Medical History:   Diagnosis Date    Asthma     EXERCISED INDUCED    Disease of nasal cavity and sinuses     History of cerebral meningioma     Hypothyroid 2010    Hypothyroidism     Ill-defined condition     LICHEN PLANUS SKIN DISORDER    Neurological disorder       Past Surgical History:   Procedure Laterality Date    HX BREAST BIOPSY Left     NEG    [facility]    HX OTHER SURGICAL  2012    brain tumor removal    NEUROLOGICAL PROCEDURE UNLISTED  2012    BENIGN BRAIN TUMOR REMOVED     Family History   Problem Relation Age of Onset    Hypertension Mother     Elevated Lipids Mother     Cancer Father         ESAPHOGUS    COPD Father     Hypertension Sister    [facility] Elevated Lipids Sister     Thyroid Disease Sister     Arthritis-osteo Sister     Heart Disease Brother     No Known Problems Sister     No Known Problems Sister       Social History     Tobacco Use    Smoking status: Former Smoker     Last attempt to quit: 5/9/2009     Years since quitting: 10.7    Smokeless tobacco: Never Used   Substance Use Topics    Alcohol use: Yes     Alcohol/week: 8.0 standard drinks     Types: 8 Cans of beer per week         Current Outpatient Medications:     b complex vitamins tablet, Take 1 Tab by mouth daily. , Disp: , Rfl:     BLISOVI 24 FE 1 mg-20 mcg (24)/75 mg (4) tab, TAKE 1 TABLET BY MOUTH EVERY DAY, Disp: , Rfl: 3    PROAIR HFA 90 mcg/actuation inhaler, , Disp: , Rfl: 4    levothyroxine (SYNTHROID) 50 mcg tablet, , Disp: , Rfl: 1        No Known Allergies   MRI Results (most recent):  No results found for this or any previous visit. No results found for this or any previous visit. Review of Systems:  A comprehensive review of systems was negative except for: Constitutional: positive for fatigue and malaise  Neurological: positive for headaches, memory problems, coordination problems and Confusional concentration difficulty   Vitals:    01/21/20 0919   BP: 124/80   Pulse: 82   Resp: 12   SpO2: 98%   Weight: 146 lb (66.2 kg)   Height: 5' 2\" (1.575 m)     Objective:     I      NEUROLOGICAL EXAM:    Appearance: The patient is well developed, well nourished, provides a coherent history and is in no acute distress. Mental Status: Oriented to time, place and person, and the president, patient could remember 3 of 3 words at 30 seconds with distraction, could do serial sevens okay, spell world backwards, and draw a clock that shows a time 10:50, and cognitive function is normal and speech is fluent and no aphasia or dysarthria.  Mood and affect appropriate. Cranial Nerves:   Intact visual fields. Visual acuity was 20/20 on the right and 20/25 on the left, Fundi are benign, disc are flat, no lesions seen on funduscopy. LOLA, EOM's full, no nystagmus, no ptosis. Facial sensation is normal. Corneal reflexes are not tested. Facial movement is symmetric. Hearing is normal bilaterally. Palate is midline with normal sternocleidomastoid and trapezius muscles are normal. Tongue is midline. Neck without meningismus or bruits  Temporal arteries are not tender or enlarged  TMJ areas are not tender on palpation   Motor:  5/5 strength in upper and lower proximal and distal muscles. Normal bulk and tone. No fasciculations. Rapid alternating movement is symmetric and intact bilaterally   Reflexes:   Deep tendon reflexes 2+/4 and symmetrical.  No babinski or clonus present   Sensory:   Normal to touch, pinprick and vibration and temperature. DSS is intact   Gait:  Normal gait for patient's age. Tremor:   No tremor noted. Cerebellar:  No abnormal cerebellar signs present on Romberg and tandem testing and finger-nose-finger exam.   Neurovascular:  Normal heart sounds and regular rhythm, peripheral pulses intact, and no carotid bruits. Assessment:       ICD-10-CM ICD-9-CM    1. Convulsions, unspecified convulsion type ([facility] R56.9 780.39    2. Disease of nasal cavity and sinuses J34.9 478.19    3. Altered mental status, unspecified altered mental status type R41.82 780.97    4. Bilateral carotid artery stenosis I65.23 433.10      433.30    5. History of cerebral meningioma Z86.69 V12.41    6. Cerebral microvascular disease I67.9 437.9      Active Problems:    * No active hospital problems. *      Plan:     Patient continues to do exceedingly well, we will continue her current therapy, we will follow her again in 1 years time or earlier as needed she will call me if she has any further spells.   Unusual history of cognitive issues occurring for about a week in July and October 2018 of unclear etiology, with unremarkable MRI of the brain and a normal exam now an essentially normal EEG for postop state, and carotid Dopplers  We reviewed all these personally on the PACS system, and I agree that they were unremarkable for any explanation of her symptoms. She does not seem to show any cognitive difficulty or deficits, and does not seem to have any focal deficits on exam.  We will follow the patient again in 12 months time or earlier as needed and she will call immediately has any further spells. Encouraged to remain mentally and physically active, her vitamins and her thyroid pills and vitamin D on a daily basis, call us if she has any other problems. Difficult case, 35 minutes spent with patient, reviewing her x-rays, review her labs, her diagnosis prognosis and further treatment evaluation and therapy to be considered. Signed By: Real Miranda MD     January 21, 2020       CC: Peggy Wetzel MD  FAX: [phone]    This note will not be viewable in [address]

## 2021-02-11 ENCOUNTER — OFFICE VISIT (OUTPATIENT)
Dept: NEUROLOGY | Age: 46
End: 2021-02-11
Payer: COMMERCIAL

## 2021-02-11 VITALS — BODY MASS INDEX: 27.25 KG/M2 | WEIGHT: 149 LBS

## 2021-02-11 DIAGNOSIS — J34.9 DISEASE OF NASAL CAVITY AND SINUSES: Primary | ICD-10-CM

## 2021-02-11 DIAGNOSIS — R41.82 ALTERED MENTAL STATUS, UNSPECIFIED ALTERED MENTAL STATUS TYPE: ICD-10-CM

## 2021-02-11 PROCEDURE — 99214 OFFICE O/P EST MOD 30 MIN: CPT | Performed by: PSYCHIATRY & NEUROLOGY

## 2021-02-11 RX ORDER — BISMUTH SUBSALICYLATE 262 MG
1 TABLET,CHEWABLE ORAL DAILY
COMMUNITY

## 2021-02-11 RX ORDER — CHOLECALCIFEROL (VITAMIN D3) 50 MCG
1 CAPSULE ORAL DAILY
COMMUNITY

## 2021-02-11 NOTE — PROGRESS NOTES
Neurology follow-up note    Chief Complaint   Patient presents with    Follow-up       SUBJECTIVE  Jude Reynolds is a 39 y.o. female who presented with an episode of confusion and mild cognitive disturbance that happened in 2018 in the setting of sinus issues. She had MRI of the brain and EEG and they were normal.  She states that her sinus issues were taking care of and has not had recurrence of cognitive disturbance. No seizures. Does not have any problems with memory. Current Outpatient Medications   Medication Sig    omega 3-dha-epa-fish oil (Fish Oil) 100-160-1,000 mg cap Take 1 Tab by mouth daily.  multivitamin (ONE A DAY) tablet Take 1 Tab by mouth daily.  BLISOVI 24 FE 1 mg-20 mcg (24)/75 mg (4) tab TAKE 1 TABLET BY MOUTH EVERY DAY    PROAIR HFA 90 mcg/actuation inhaler     levothyroxine (SYNTHROID) 50 mcg tablet      No current facility-administered medications for this visit. EXAMINATION:   Visit Vitals  Wt 149 lb (67.6 kg)   BMI 27.25 kg/m²        General:  Exam limited because of virtual visit. General appearance: Pt is in no acute distress     Neurological Examination:   Mental Status: AAO x3. Speech is fluent. Follows commands, has normal fund of knowledge, attention, short term recall, comprehension and insight. Cranial Nerves:  Extraocular movements are full. Facial movement intact. Hearing intact to conversation. Shoulder shrug symmetric. Tongue midline. Motor: Strength is symmetric in all 4 ext.      Sensation: Normal according to patient to light touch    Coordination/Cerebellar: Intact to finger-nose-finger     Gait: Casual gait is normal.     Laboratory review:   Results for orders placed or performed during the hospital encounter of 04/19/16   HCG URINE, QL. - POC   Result Value Ref Range    Pregnancy test,urine (POC) NEGATIVE  NEG         Imaging review:  None    Documentation review:  None    Assessment/Plan:   Jude Reynolds is a 39 y.o. female who presented with an episode of confusion and mild cognitive disturbance that happened in 2018 in the setting of sinus issues. She had MRI of the brain and EEG and they were normal.  She states that her sinus issues were taking care of and has not had recurrence of cognitive disturbance. No seizures. Does not have any problems with memory. Since she has not had any cognitive issues over the past 2 years, we can see her as needed. She can call the office if she experiences any new neurological symptoms. 3 most recent PHQ Screens 2/11/2021   Little interest or pleasure in doing things Not at all   Feeling down, depressed, irritable, or hopeless Not at all   Total Score PHQ 2 0     Primary care to address possible depression if PHQ-9 score is more than 9. ICD-10-CM ICD-9-CM    1. Disease of nasal cavity and sinuses  J34.9 478.19    2. Altered mental status, unspecified altered mental status type  R41.82 780.97         Electronically signed. Antonio Hendricks MD  Neurologist    CC: Jm Castillo MD  Fax: [phone]    This note was created using voice recognition software. Despite editing, there may be syntax errors.

## 2021-02-11 NOTE — PROGRESS NOTES
Identified pt with two pt identifiers(name and ). Reviewed record in preparation for visit and have obtained necessary documentation. Chief Complaint   Patient presents with    Follow-up      Vitals:    21 0803   Weight: 149 lb (67.6 kg)       Health Maintenance Review: Patient reminded of \"due or due soon\" health maintenance. I have asked the patient to contact his/her primary care provider (PCP) for follow-up on his/her health maintenance. Coordination of Care Questionnaire:  :   1) Have you been to an emergency room, urgent care, or hospitalized since your last visit? If yes, where when, and reason for visit? no       2. Have seen or consulted any other health care provider since your last visit? If yes, where when, and reason for visit? NO      Patient is accompanied by self I have received verbal consent from Nadia Paget to discuss any/all medical information while they are present in the room.